# Patient Record
Sex: FEMALE | Race: WHITE
[De-identification: names, ages, dates, MRNs, and addresses within clinical notes are randomized per-mention and may not be internally consistent; named-entity substitution may affect disease eponyms.]

---

## 2019-11-19 ENCOUNTER — HOSPITAL ENCOUNTER (EMERGENCY)
Dept: HOSPITAL 38 - CC.ED | Age: 29
Discharge: HOME | End: 2019-11-19
Payer: COMMERCIAL

## 2019-11-19 VITALS — HEART RATE: 76 BPM | DIASTOLIC BLOOD PRESSURE: 69 MMHG | SYSTOLIC BLOOD PRESSURE: 136 MMHG

## 2019-11-19 DIAGNOSIS — Z88.2: ICD-10-CM

## 2019-11-19 DIAGNOSIS — O26.851: Primary | ICD-10-CM

## 2019-11-19 DIAGNOSIS — Z3A.11: ICD-10-CM

## 2019-11-19 NOTE — EDM.PDOC
ED HPI GENERAL MEDICAL PROBLEM





- General


Chief Complaint: OB/GYN Problem


Stated Complaint: 11 WEEKS PG/SPOTTING


Time Seen by Provider: 19 12:40


Source of Information: Reports: Patient


History Limitations: Reports: No Limitations





- History of Present Illness


INITIAL COMMENTS - FREE TEXT/NARRATIVE: 





Patient presents to ER with complaints of vaginal bleeding/spotting.  States 

noted blood on the tissue when wiping and a small amount on her underwear 

today.  No cramping.  Is 11 weeks gestation with twins.  Denies burning with 

urination.  No cold symptoms or fevers.  No recent sexual intercourse.  Did 

have a recent ultrasound with her visit with Dr. Etienne, was no concerns at 

that time.  Does admit that she spotted with her other pregnancy.  This is her 

3rd pregnancy, had one live birth, one miscarriage.  


Onset: Today


Duration: Minutes:


Associated Symptoms: Reports: No Other Symptoms





- Related Data


 Allergies











Allergy/AdvReac Type Severity Reaction Status Date / Time


 


Sulfa (Sulfonamide Allergy  Hives Verified 19 12:27





Antibiotics)     











Home Meds: 


 Home Meds





Aspirin 81 mg PO DAILY 19 [History]


Prenatal No122/Iron/Folic Acid [Prenatal Multi Tablet] 1 tab PO DAILY 19 [

History]











Past Medical History


OB/GYN History: Reports: Pregnancy, Spontaneous 





- Past Surgical History


HEENT Surgical History: Reports: Adenoidectomy, Tonsillectomy


Female  Surgical History: Reports:  Section





Social & Family History





- Family History


Family Medical History: Noncontributory





- Tobacco Use


Smoking Status *Q: Never Smoker





- Caffeine Use


Caffeine Use: Reports: None





- Recreational Drug Use


Recreational Drug Use: No





ED ROS GENERAL





- Review of Systems


Review Of Systems: See Below


Constitutional: Reports: Fatigue.  Denies: Fever, Chills, Malaise, Weakness


HEENT: Reports: No Symptoms


Respiratory: Reports: No Symptoms


Cardiovascular: Reports: No Symptoms


GI/Abdominal: Reports: No Symptoms


: Reports: Other (vaginal bleeding)


Musculoskeletal: Reports: No Symptoms


Skin: Reports: No Symptoms


Neurological: Reports: No Symptoms





ED EXAM PREGNANCY





- Physical Exam


Exam: See Below


Exam Limited By: No Limitations


General Appearance: Alert, WD/WN, No Apparent Distress


Ears: Normal External Exam, Normal TMs


Nose: Normal Inspection, Normal Mucosa, No Blood


Throat/Mouth: Normal Inspection, Normal Oropharynx


Head: Normocephalic


Neck: Normal Inspection, Supple, Non-Tender


Respiratory/Chest: No Respiratory Distress, Lungs Clear, Normal Breath Sounds


Cardiovascular: Regular Rate, Rhythm


GI/Abdominal Exam: Normal Bowel Sounds, Soft, Non-Tender


 (Female) Exam: Vaginal Bleeding, Other (no pelvic exam done; ultrasound 

obtained)


Fetal Heart Tones: Present


Extremities: Normal Inspection, No Pedal Edema


Neurological: Alert, Oriented





Course





- Vital Signs


Last Recorded V/S: 


 Last Vital Signs











Temp  98.4 F   19 12:24


 


Pulse  76   19 12:24


 


Resp  18   19 12:24


 


BP  136/69   19 12:24


 


Pulse Ox  98   19 12:24














- Orders/Labs/Meds


Orders: 


 Active Orders 24 hr











 Category Date Time Status


 


 OB 1st Tri Ea Addl Gest [US] Stat Exams  19 12:45 Taken


 


 OB Ltd 1 or More Fetus [US] Stat Exams  19 12:52 Taken











Labs: 


 Laboratory Tests











  19 Range/Units





  13:24 


 


Urine Color  Light yellow  (YELLOW)  


 


Urine Appearance  Clear  (CLEAR)  


 


Urine pH  7.0  (4.5-8.0)  


 


Ur Specific Gravity  1.010  (1.003-1.020)  


 


Urine Protein  Negative  (NEGATIVE)  mg/dL


 


Urine Glucose (UA)  Negative  (NEGATIVE)  mg/dL


 


Urine Ketones  Negative  (NEGATIVE)  mg/dL


 


Urine Occult Blood  Trace-intact H  (NEGATIVE)  


 


Urine Nitrite  Negative  (NEGATIVE)  


 


Urine Bilirubin  Negative  (NEGATIVE)  


 


Urine Urobilinogen  0.2  (0.2-1.0)  EU/dL


 


Ur Leukocyte Esterase  Negative  (NEGATIVE)  


 


Urine RBC  0-5  (0-5)  /HPF


 


Urine WBC  Not seen  (0-5)  /HPF


 


Ur Epithelial Cells  Occasional H  (NOT SEEN)  /HPF














- Re-Assessments/Exams


Free Text/Narrative Re-Assessment/Exam: 





19 1500


Urine clear


Ultrasound results received, stable, no acute concerns.  Patient informed.  





Departure





- Departure


Time of Disposition: 15:04


Disposition: Home, Self-Care 01


Condition: Good


Clinical Impression: 


 Spotting affecting pregnancy in first trimester








- Discharge Information


*PRESCRIPTION DRUG MONITORING PROGRAM REVIEWED*: No


*COPY OF PRESCRIPTION DRUG MONITORING REPORT IN PATIENT TERRY: No


Referrals: 


PCP,None [Primary Care Provider] - 


Forms:  ED Department Discharge


Additional Instructions: 


1.  Rest


2.  Push fluids


3.  Pelvic rest


4.  Follow up with Dr. Etienne as planned





- My Orders


Last 24 Hours: 


My Active Orders





19 12:45


OB 1st Tri Ea Addl Gest [US] Stat 





19 12:52


OB Ltd 1 or More Fetus [US] Stat 














- Assessment/Plan


Last 24 Hours: 


My Active Orders





19 12:45


OB 1st Tri Ea Addl Gest [US] Stat 





19 12:52


OB Ltd 1 or More Fetus [US] Stat